# Patient Record
Sex: FEMALE | Race: WHITE | URBAN - METROPOLITAN AREA
[De-identification: names, ages, dates, MRNs, and addresses within clinical notes are randomized per-mention and may not be internally consistent; named-entity substitution may affect disease eponyms.]

---

## 2017-03-13 ENCOUNTER — APPOINTMENT (RX ONLY)
Dept: URBAN - METROPOLITAN AREA CLINIC 39 | Facility: CLINIC | Age: 27
Setting detail: DERMATOLOGY
End: 2017-03-13

## 2017-03-13 DIAGNOSIS — L91.0 HYPERTROPHIC SCAR: ICD-10-CM

## 2017-03-13 DIAGNOSIS — L70.0 ACNE VULGARIS: ICD-10-CM

## 2017-03-13 DIAGNOSIS — L81.1 CHLOASMA: ICD-10-CM

## 2017-03-13 PROBLEM — L85.3 XEROSIS CUTIS: Status: ACTIVE | Noted: 2017-03-13

## 2017-03-13 PROCEDURE — ? TREATMENT REGIMEN

## 2017-03-13 PROCEDURE — ? PRESCRIPTION

## 2017-03-13 PROCEDURE — ? COUNSELING

## 2017-03-13 PROCEDURE — ? INTRALESIONAL KENALOG

## 2017-03-13 PROCEDURE — 99202 OFFICE O/P NEW SF 15 MIN: CPT | Mod: 25

## 2017-03-13 PROCEDURE — 11900 INJECT SKIN LESIONS </W 7: CPT

## 2017-03-13 RX ORDER — ADAPALENE AND BENZOYL PEROXIDE 1; 25 MG/G; MG/G
GEL TOPICAL QHS
Qty: 1 | Refills: 3 | Status: ERX | COMMUNITY
Start: 2017-03-13

## 2017-03-13 RX ADMIN — ADAPALENE AND BENZOYL PEROXIDE: 1; 25 GEL TOPICAL at 16:38

## 2017-03-13 ASSESSMENT — LOCATION DETAILED DESCRIPTION DERM
LOCATION DETAILED: RIGHT MEDIAL SUPERIOR CHEST
LOCATION DETAILED: LEFT FOREHEAD
LOCATION DETAILED: LEFT CENTRAL MALAR CHEEK
LOCATION DETAILED: LEFT LATERAL SUPERIOR CHEST
LOCATION DETAILED: RIGHT CENTRAL MALAR CHEEK

## 2017-03-13 ASSESSMENT — LOCATION ZONE DERM
LOCATION ZONE: TRUNK
LOCATION ZONE: FACE

## 2017-03-13 ASSESSMENT — LOCATION SIMPLE DESCRIPTION DERM
LOCATION SIMPLE: RIGHT CHEEK
LOCATION SIMPLE: LEFT CHEEK
LOCATION SIMPLE: CHEST
LOCATION SIMPLE: LEFT FOREHEAD

## 2017-03-13 NOTE — HPI: SKIN LESION
Is This A New Presentation, Or A Follow-Up?: Skin Lesion
How Severe Is Your Skin Lesion?: mild
Has Your Skin Lesion Been Treated?: been treated
Additional History: Patient states she was seeing  a dermatologist in [place]  who was treating it with cortisone injections.
When Was It Treated?: 01/2017

## 2017-03-13 NOTE — PROCEDURE: INTRALESIONAL KENALOG
Total Volume Injected (Ccs- Only Use Numbers And Decimals): 0.1
Consent: The risks of atrophy, infection and incomplete effect were reviewed with the patient.
Expiration Date (Optional): 06/2018
Include Z78.9 (Other Specified Conditions Influencing Health Status) As An Associated Diagnosis?: No
Medical Necessity Clause: This procedure was medically necessary because the lesions that were treated were:
Administered By (Optional): Dr Reese
Treatment Number (Optional): 2
X Size Of Lesion In Cm (Optional): 0
Concentration Of Solution Injected (Mg/Ml): 2.5
Detail Level: Detailed
Lot # (Optional): [id number]
Kenalog Preparation: Kenalog

## 2017-03-13 NOTE — HPI: PIMPLES (ACNE)
How Severe Is Your Acne?: mild
Is This A New Presentation, Or A Follow-Up?: Acne
Females Only: When Was Your Last Menstrual Period?: 02/16/2017

## 2017-03-13 NOTE — PROCEDURE: TREATMENT REGIMEN
Detail Level: Simple
Initiate Treatment: ILK, diluted, as she was adamant to treat the two remaining indurated scars chest. PHOTO
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
Plan: Has 4% HQ at home. Use BID x 3 month in/off limit. SPF daily

## 2017-03-16 ENCOUNTER — RX ONLY (OUTPATIENT)
Age: 27
Setting detail: RX ONLY
End: 2017-03-16

## 2017-03-16 RX ORDER — ADAPALENE 3 MG/G
GEL TOPICAL
Qty: 1 | Refills: 3 | Status: ERX | COMMUNITY
Start: 2017-03-16